# Patient Record
Sex: FEMALE
[De-identification: names, ages, dates, MRNs, and addresses within clinical notes are randomized per-mention and may not be internally consistent; named-entity substitution may affect disease eponyms.]

---

## 2018-05-16 ENCOUNTER — HOSPITAL ENCOUNTER (INPATIENT)
Dept: HOSPITAL 45 - C.EDC | Age: 68
LOS: 2 days | Discharge: HOME | DRG: 869 | End: 2018-05-18
Attending: HOSPITALIST | Admitting: HOSPITALIST
Payer: COMMERCIAL

## 2018-05-16 VITALS
HEART RATE: 79 BPM | OXYGEN SATURATION: 97 % | SYSTOLIC BLOOD PRESSURE: 100 MMHG | TEMPERATURE: 97.52 F | DIASTOLIC BLOOD PRESSURE: 63 MMHG

## 2018-05-16 VITALS
DIASTOLIC BLOOD PRESSURE: 65 MMHG | HEART RATE: 75 BPM | OXYGEN SATURATION: 99 % | SYSTOLIC BLOOD PRESSURE: 116 MMHG | TEMPERATURE: 98.24 F

## 2018-05-16 VITALS — OXYGEN SATURATION: 97 %

## 2018-05-16 VITALS
HEIGHT: 62 IN | BODY MASS INDEX: 29.41 KG/M2 | HEIGHT: 62 IN | BODY MASS INDEX: 29.41 KG/M2 | BODY MASS INDEX: 29.41 KG/M2 | WEIGHT: 159.84 LBS | WEIGHT: 159.84 LBS

## 2018-05-16 VITALS — OXYGEN SATURATION: 99 %

## 2018-05-16 DIAGNOSIS — I10: ICD-10-CM

## 2018-05-16 DIAGNOSIS — A77.49: Primary | ICD-10-CM

## 2018-05-16 DIAGNOSIS — M79.1: ICD-10-CM

## 2018-05-16 DIAGNOSIS — E11.9: ICD-10-CM

## 2018-05-16 DIAGNOSIS — Z88.1: ICD-10-CM

## 2018-05-16 DIAGNOSIS — Z79.82: ICD-10-CM

## 2018-05-16 DIAGNOSIS — M54.9: ICD-10-CM

## 2018-05-16 DIAGNOSIS — D70.9: ICD-10-CM
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
## 2018-05-16 DIAGNOSIS — E78.5: ICD-10-CM

## 2018-05-16 DIAGNOSIS — D69.6: ICD-10-CM

## 2018-05-16 DIAGNOSIS — Z79.84: ICD-10-CM

## 2018-05-16 DIAGNOSIS — R00.0: ICD-10-CM

## 2018-05-16 LAB
ALBUMIN SERPL-MCNC: 3.5 GM/DL (ref 3.4–5)
ALP SERPL-CCNC: 91 U/L (ref 45–117)
ALT SERPL-CCNC: 48 U/L (ref 12–78)
AST SERPL-CCNC: 67 U/L (ref 15–37)
BASOPHILS # BLD: 0.04 K/UL (ref 0–0.2)
BASOPHILS NFR BLD: 1.1 %
BUN SERPL-MCNC: 17 MG/DL (ref 7–18)
CALCIUM SERPL-MCNC: 8.4 MG/DL (ref 8.5–10.1)
CO2 SERPL-SCNC: 24 MMOL/L (ref 21–32)
CREAT SERPL-MCNC: 0.86 MG/DL (ref 0.6–1.2)
EOS ABS #: 0 K/UL (ref 0–0.5)
EOSINOPHIL NFR BLD AUTO: 74 K/UL (ref 130–400)
GLUCOSE SERPL-MCNC: 88 MG/DL (ref 70–99)
HCT VFR BLD CALC: 38.5 % (ref 37–47)
HGB BLD-MCNC: 13.4 G/DL (ref 12–16)
IG#: 0.01 K/UL (ref 0–0.02)
IMM GRANULOCYTES NFR BLD AUTO: 30.7 %
INFLUENZA B ANTIGEN: (no result)
INR PPP: 1 (ref 0.9–1.1)
LYMPHOCYTES # BLD: 1.07 K/UL (ref 1.2–3.4)
MCH RBC QN AUTO: 28.4 PG (ref 25–34)
MCHC RBC AUTO-ENTMCNC: 34.8 G/DL (ref 32–36)
MCV RBC AUTO: 81.6 FL (ref 80–100)
MONO ABS #: 0.39 K/UL (ref 0.11–0.59)
MONOCYTES NFR BLD: 11.2 %
NEUT ABS #: 1.98 K/UL (ref 1.4–6.5)
NEUTROPHILS # BLD AUTO: 0 %
NEUTROPHILS NFR BLD AUTO: 56.7 %
PMV BLD AUTO: 10 FL (ref 7.4–10.4)
POTASSIUM SERPL-SCNC: 3.9 MMOL/L (ref 3.5–5.1)
PROT SERPL-MCNC: 7.3 GM/DL (ref 6.4–8.2)
RED CELL DISTRIBUTION WIDTH CV: 13.9 % (ref 11.5–14.5)
RED CELL DISTRIBUTION WIDTH SD: 41.9 FL (ref 36.4–46.3)
SODIUM SERPL-SCNC: 133 MMOL/L (ref 136–145)
WBC # BLD AUTO: 3.49 K/UL (ref 4.8–10.8)

## 2018-05-16 RX ADMIN — LIDOCAINE SCH PATCH: 50 PATCH CUTANEOUS at 23:43

## 2018-05-16 RX ADMIN — INSULIN ASPART SCH UNITS: 100 INJECTION, SOLUTION INTRAVENOUS; SUBCUTANEOUS at 21:00

## 2018-05-16 RX ADMIN — KETOROLAC TROMETHAMINE PRN MG: 15 INJECTION INTRAMUSCULAR; INTRAVENOUS at 23:44

## 2018-05-16 NOTE — DIAGNOSTIC IMAGING REPORT
ABDOMEN AND PELVIS CT WITH IV CONTRAST



CT DOSE: 728.33 mGycm



HISTORY: Acute right lower quadrant abdominal pain with fever  fever rlq pain

r/o appy



TECHNIQUE: Multiaxial CT images of the abdomen and pelvis were performed

following the use of intravenous contrast.  A dose lowering technique was

utilized adhering to the principles of ALARA.



COMPARISON STUDY: None.



FINDINGS: 

Dependent subsegmental bibasilar opacities suggest atelectasis. There is no

pneumatosis or pneumoperitoneum identified. Coronary arterial calcifications are

noted.



Contracted gallbladder. Mild periportal edema, likely secondary to

overhydration. Spleen is mildly enlarged, 15 cm. Pancreas and adrenal glands are

within normal limits. Mild nonspecific bilateral perinephric stranding.

Bilateral renal cysts measuring up to 2.0 cm on the left. No renal calculi or

obstructive uropathy identified. Bladder is within normal limits. Prior

hysterectomy. No adnexal mass lesions identified. Trace free pelvic fluid of

unknown etiology. There is moderate mixed plaquing of the aorta without aneurysm

identified. No bulky adenopathy. Nonspecific mildly prominent and enlarged

periportal lymph nodes measure up to 12 mm in short axis.



Mild wall thickening of the distal gastric body and antrum with partial

distention. No bowel obstruction. Mild colonic diverticulosis without

diverticulitis. Moderate stool volume throughout the colon. Underdistention of

the transverse colon. High attenuating material seen within the cecum. The

visualized appendix appears normal.



Soft tissues are unremarkable. Dystrophic calcifications within the region of

the bilateral hamstring attachment sites, right greater than left.

Chondrocalcinosis with degenerative changes about the bilateral hips. Bone

island of the left iliac bones.



IMPRESSION: 



1.  No evidence of bowel obstruction. Normal appendix.

2. Mild wall thickening of the distal gastric body and antrum is likely

secondary to partial distention with gastritis thought to be less likely.

3. Mild nonspecific periportal adenopathy.

4. Splenomegaly.

5. Trace free pelvic fluid of unknown etiology.

6. Additional findings as above.







Electronically signed by:  Shaw Gtz M.D.

5/16/2018 4:26 PM



Dictated Date/Time:  5/16/2018 4:17 PM

## 2018-05-16 NOTE — HISTORY AND PHYSICAL
History & Physical


Date & Time of Service:


May 16, 2018 at 18:03


Chief Complaint:


Illness


Primary Care Physician:


Tonya Brown M.D.


History of Present Illness


Source:  patient, family (Daughter,  present at bedside)


This is a 67-year-old female with medical history of type 2 diabetes, presents 

to ER with complaint of weakness tiredness, low-grade fever for the past few 

weeks


At baseline patient is very active involved in housework independent in her ADLs


Had ongoing nausea, headache, poor appetite


history of multiple tick bites


Patient had 3 tics on her arm 2 weeks ago, which patient's her  removed 

with tweezers


Was seen at the clinic for her ongoing symptoms


Outpatient Lyme titer was checked which was negative 





This morning she had an witnessed fall, losing balance and falling on the floor-

 was present, helped patient to get up


Patient mentions of feeling dizzy and lightheaded prior to fall


No loss of consciousness, denies of any chest pain or shortness of breath





On arrival to ER patient was found to be febrile, borderline neutropenic, 

thrombocytopenic (had normal heart work in the past)





Past Medical/Surgical History


Medical Problems:


(1) Diabetes


(2) Fever


(3) HLD (hyperlipidemia)


(4) HTN (hypertension)


(5) Tachycardia








Social History


Smoking Status:  Never Smoker


Marital Status:  


Occupational Status:  retired





Allergies


Coded Allergies:  


     Cephalexin (Verified  Allergy, Intermediate, Rash, 5/16/18)





Home Medications


Scheduled


Aspirin (Aspirin Ec), 81 MG PO DAILY


Atorvastatin (Lipitor), 1 TAB PO DAILY


Glimepiride (Glimepiride), 1 TAB PO DAILY





Miscellaneous Medications


Empagliflozin (Jardiance)


Glucostix Blood Test Strips (Onetouch Ultra Blue)


Lisinopril (Lisinopril)


Magnesium Oxide (Mag-Ox), 400 MG PO


Metformin HCl (Metformin HCl ER)





Review of Systems


Constitutional:  + fever, + chills, + sweats, + weight loss, + weakness, + 

fatigue


Respiratory:  + shortness of breath, + dyspnea on exertion


Abdomen:  + pain (Abdominal pain), + nausea, + vomiting


Musculoskeletal:  + joint pain (Diffuse muscle and joint pain), + muscle pain, 

+ problem reported (Back pain after sustaining a fall)


Neurologic:  + weakness, + numbness/tingling, + vertigo, + balance problems


Endocrine:  + fatigue





Physical Exam


Vital Signs











  Date Time  Temp Pulse Resp B/P (MAP) Pulse Ox O2 Delivery O2 Flow Rate FiO2


 


5/16/18 18:00 36.7 70 18 112/59 99 Nasal Cannula  


 


5/16/18 17:12  83      


 


5/16/18 16:37 36.9 76 18 112/57 98 Nasal Cannula 2.0 


 


5/16/18 15:05 38.0       


 


5/16/18 14:31  99 18 128/68 97 Nasal Cannula 2.0 


 


5/16/18 14:01    134/74    


 


5/16/18 13:54  100   99   


 


5/16/18 13:49    144/76    


 


5/16/18 13:19  103      


 


5/16/18 13:00    148/85    


 


5/16/18 13:00     100 Nasal Cannula  


 


5/16/18 13:00     100 Nasal Cannula 2.0 


 


5/16/18 12:57 39.5 105 20 148/85 94 Room Air  








General Appearance:  + mild distress


Head:  normocephalic, atraumatic (100 back pain)


Eyes:  normal inspection, sclerae normal


ENT:  normal ENT inspection


Neck:  no carotid bruits, trachea midline


Respiratory/Chest:  chest non-tender, lungs clear, normal breath sounds, no 

respiratory distress


Cardiovascular:  regular rate, rhythm, no edema, normal peripheral pulses


Abdomen/GI:  normal bowel sounds, non tender, soft


Back:  + paravertebral tenderness, + pertinent finding (Tenderness in upper mid 

thoracic area started after fall this morning /no bruise / skin tear noted)


Extremities/Musculoskelatal:  no calf tenderness, no pedal edema


Neurologic/Psych:  no motor/sensory deficits, alert, normal mood/affect, 

oriented x 3, + pertinent finding (Generalized weakness)


Skin:  no rash





Diagnostics


Laboratory Results





Results Past 24 Hours








Test


  5/16/18


12:22 5/16/18


13:22 5/16/18


13:35 5/16/18


13:49 Range/Units


 


 


White Blood Count 3.49    4.8-10.8  K/uL


 


Red Blood Count 4.72    4.2-5.4  M/uL


 


Hemoglobin 13.4    12.0-16.0  g/dL


 


Hematocrit 38.5    37-47  %


 


Mean Corpuscular Volume 81.6      fL


 


Mean Corpuscular Hemoglobin 28.4    25-34  pg


 


Mean Corpuscular Hemoglobin


Concent 34.8


  


  


  


  32-36  g/dl


 


 


Platelet Count 74    130-400  K/uL


 


Mean Platelet Volume 10.0    7.4-10.4  fL


 


Neutrophils (%) (Auto) 56.7     %


 


Lymphocytes (%) (Auto) 30.7     %


 


Monocytes (%) (Auto) 11.2     %


 


Eosinophils (%) (Auto) 0.0     %


 


Basophils (%) (Auto) 1.1     %


 


Neutrophils # (Auto) 1.98    1.4-6.5  K/uL


 


Lymphocytes # (Auto) 1.07    1.2-3.4  K/uL


 


Monocytes # (Auto) 0.39    0.11-0.59  K/uL


 


Eosinophils # (Auto) 0.00    0-0.5  K/uL


 


Basophils # (Auto) 0.04    0-0.2  K/uL


 


RDW Standard Deviation 41.9    36.4-46.3  fL


 


RDW Coefficient of Variation 13.9    11.5-14.5  %


 


Immature Granulocyte % (Auto) 0.3     %


 


Immature Granulocyte # (Auto) 0.01    0.00-0.02  K/uL


 


Platelet Estimate DECREASED     


 


Sodium Level 133    136-145  mmol/L


 


Potassium Level 3.9    3.5-5.1  mmol/L


 


Chloride Level 100      mmol/L


 


Carbon Dioxide Level 24    21-32  mmol/L


 


Anion Gap 9.0    3-11  mmol/L


 


Blood Urea Nitrogen 17    7-18  mg/dl


 


Creatinine


  0.86


  


  


  


  0.60-1.20


mg/dl


 


Est Creatinine Clear Calc


Drug Dose 59.5


  


  


  


   ml/min


 


 


Estimated GFR (


American) 81.0


  


  


  


   


 


 


Estimated GFR (Non-


American 69.9


  


  


  


   


 


 


BUN/Creatinine Ratio 19.3    10-20  


 


Random Glucose 88    70-99  mg/dl


 


Calcium Level 8.4    8.5-10.1  mg/dl


 


Total Bilirubin 1.0    0.2-1  mg/dl


 


Direct Bilirubin 0.2    0-0.2  mg/dl


 


Aspartate Amino Transf


(AST/SGOT) 67


  


  


  


  15-37  U/L


 


 


Alanine Aminotransferase


(ALT/SGPT) 48


  


  


  


  12-78  U/L


 


 


Alkaline Phosphatase 91      U/L


 


Total Creatine Kinase 47      U/L


 


Total Protein 7.3    6.4-8.2  gm/dl


 


Albumin 3.5    3.4-5.0  gm/dl


 


Lyme Disease IgG Antibody NEG    NEG  


 


Lyme Disease IgM Antibody NEG    NEG  


 


Prothrombin Time


  


  10.7


  


  


  9.0-12.0


SECONDS


 


Prothromb Time International


Ratio 


  1.0


  


  


  0.9-1.1  


 


 


Lactic Acid Level   1.0  0.4-2.0  mmol/L


 


Influenza Type A Antigen    Neg for Influ A NEG  


 


Influenza Type B Antigen    Neg for Influ B NEG  


 


Test


  5/16/18


15:20 5/16/18


18:02 


  


  Range/Units


 


 


Urine Color YELLOW     


 


Urine Appearance CLEAR    CLEAR  


 


Urine pH 5.0    4.5-7.5  


 


Urine Specific Gravity 1.032    1.000-1.030  


 


Urine Protein TRACE    NEG  


 


Urine Glucose (UA) 3+    NEG  


 


Urine Ketones 2+    NEG  


 


Urine Occult Blood NEG    NEG  


 


Urine Nitrite NEG    NEG  


 


Urine Bilirubin NEG    NEG  


 


Urine Urobilinogen NEG    NEG  


 


Urine Leukocyte Esterase NEG    NEG  


 


Urine WBC (Auto) 1-5    0-5  /hpf


 


Urine RBC (Auto) 0-4    0-4  /hpf


 


Urine Hyaline Casts (Auto) 1-5    0-5  /lpf


 


Urine Epithelial Cells (Auto) 5-10    0-5  /lpf


 


Urine Bacteria (Auto) NEG    NEG  








Microbiology Results


5/16/18 Blood Culture, Received


          Pending


5/16/18 Blood Culture, Received


          Pending





Diagnostic Radiology


Chest x-ray two-view





IMPRESSION: No active disease in the chest.





CT abdomen pelvis:


IMPRESSION: 





1.  No evidence of bowel obstruction. Normal appendix.


2. Mild wall thickening of the distal gastric body and antrum is likely


secondary to partial distention with gastritis thought to be less likely.


3. Mild nonspecific periportal adenopathy.


4. Splenomegaly.


5. Trace free pelvic fluid of unknown etiology.


6. Additional findings as above.





Impression


Assessment and Plan


FEVER, TACHYCARDIA, POSSIBLE TICK BORNE DISEASE


History of tick bite, multiple tics removed from arm approximately 2 weeks ago


Presents with more than 1 week symptoms of fever, chills generalized weakness 

fatigue, nausea vomiting headache poor appetite


Lyme titer negative


No typical rash noted


Symptom most suggestive of anaplasmosis-


Antigen and antibody titer for anaplasmosis ordered


Started with empiric antibiotic with doxycycline


IV hydration


ID eval requested











THROMBOCYTOPENIA/NEUTROPENIA


Possible secondary to tickborne disease/anaplasmosis


Continue empiric antibiotic with IV doxycycline


Follow daily CBC


Avoid antiplatelets


Ordered for peripheral blood smear-pathology to review





GENERALIZED MUSCLE PAIN AND ACHE


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


Pain controlled with as needed Tylenol


IV hydration





BACK PAIN:


After sustaining a fall earlier today/due to losing balance and generalized 

weakness


X-ray of thoracolumbar spine ordered











TYPE 2 DIABETES


Hold oral meds


Insulin sliding scale








CODE STATUS: Full code





DVT prophylaxis:





Disposition:


At baseline patient is very active


Lives at home with 


PT OT eval requested


Service consulted for discharge planning





Level of Care


Telemetry





Resuscitation Status


FULL NO CARDIOVERSION





VTE Prophylaxis


Risk Level:  Moderate


Given or contraindicated:  T.E.D. Stockings, SCD's

## 2018-05-16 NOTE — DIAGNOSTIC IMAGING REPORT
CHEST 2 VIEWS ROUTINE



CLINICAL HISTORY: Fever, sepsis.    



COMPARISON STUDY:  No previous studies for comparison.



FINDINGS: The cardiac and mediastinal contours are normal. There is no evidence

of focal pulmonary consolidation. There is no evidence of failure. No pleural

effusions are visualized.[ 



IMPRESSION: No active disease in the chest.







Electronically signed by:  Akhil Dickson M.D.

5/16/2018 1:47 PM



Dictated Date/Time:  5/16/2018 1:47 PM

## 2018-05-16 NOTE — EMERGENCY ROOM VISIT NOTE
History


Report prepared by Shey:  Smith Maza


Under the Supervision of:  Dr. Don Davis M.D.


First contact with patient:  13:13


Chief Complaint:  ILLNESS


Stated Complaint:  ILLNESS





History of Present Illness


The patient is a 67 year old female who presents to the Emergency Room with 

complaints of fever.  Per family, the patient has had body aches, nausea, and 

fevers over the past week. They note that the patient had three ticks on her 

two weeks ago, which the patient's  removed with tweezers.  No 

antibiotics were given.. The patient had a negative test for Lyme Disease 

earlier this week. she denies chest pain, SOB, headache, neck pain, urinary 

symptoms, or cough. She is not on supplemental oxygen at home. The patient 

states that she has been having some RLQ abdominal pain recently as well. She 

has been taking Tylenol and Pepto Bismol for this. She has a history of diabetes

, and states that they have been running high lately. The patient denies recent 

travel. She notes that she is allergic to Keflex.





   Source of History:  patient, family


   Onset:  A few days ago


   Quality:  other (dizziness)


   Timing:  intermittent


   Modifying Factors (Worsening):  other (standing up)


   Associated Symptoms:  + fevers, + nausea, No headache, No cough, No neck pain

, No chest pain, No SOB, No urinary symptoms


Note:


Positive: body aches.





Review of Systems


See HPI for pertinent positives and negatives.  A total of ten systems were 

reviewed and were otherwise negative.





Past Medical & Surgical


Medical Problems:


(1) Diabetes


(2) Fever


(3) HLD (hyperlipidemia)


(4) HTN (hypertension)


(5) Tachycardia








Family History


No pertinent family history stated.





Social History


Smoking Status:  Never Smoker


Marital Status:  


Occupation Status:  retired





Current/Historical Medications


Scheduled


Aspirin (Aspirin Ec), 81 MG PO DAILY


Atorvastatin (Lipitor), 1 TAB PO DAILY


Glimepiride (Glimepiride), 1 TAB PO DAILY





Miscellaneous Medications


Empagliflozin (Jardiance)


Glucostix Blood Test Strips (Onetouch Ultra Blue)


Lisinopril (Lisinopril)


Magnesium Oxide (Mag-Ox), 400 MG PO


Metformin HCl (Metformin HCl ER)





Allergies


Coded Allergies:  


     Cephalexin (Verified  Allergy, Intermediate, Rash, 5/16/18)





Physical Exam


Vital Signs











  Date Time  Temp Pulse Resp B/P (MAP) Pulse Ox O2 Delivery O2 Flow Rate FiO2


 


5/16/18 18:00 36.7 70 18 112/59 99 Nasal Cannula  


 


5/16/18 17:12  83      


 


5/16/18 16:37 36.9 76 18 112/57 98 Nasal Cannula 2.0 


 


5/16/18 15:05 38.0       


 


5/16/18 14:31  99 18 128/68 97 Nasal Cannula 2.0 


 


5/16/18 14:01    134/74    


 


5/16/18 13:54  100   99   


 


5/16/18 13:49    144/76    


 


5/16/18 13:19  103      


 


5/16/18 13:00    148/85    


 


5/16/18 13:00     100 Nasal Cannula  


 


5/16/18 13:00     100 Nasal Cannula 2.0 


 


5/16/18 12:57 39.5 105 20 148/85 94 Room Air  











Physical Exam


Physical Exam 


GENERAL:  She is oriented to person, place, and time. She appears well-

developed and well-nourished. She does not appear distressed. ____


HENT:  Exam performed. 


   Head:  Normocephalic and atraumatic. 


   Right Ear:  External ear normal. No mastoid tenderness. 


   Left Ear: External ear normal. No mastoid tenderness. 


   Mouth/Throat:  The oropharynx is clear and moist. No trismus in the jaw. No 

dental abscesses or uvula swelling. No oropharyngeal exudate or tonsillar 

abscesses. ____


EYES: Conjunctivae and EOM are normal. Pupils are equal, round, and reactive to 

light. Right eye exhibits no discharge. Left eye exhibits no discharge. No 

scleral icterus. ____


NECK: Normal range of motion. Neck supple. No JVD present. No spinous process 

tenderness present. No carotid bruit present. No rigidity. No tracheal 

deviation and normal range of motion present. No Brudzinski's sign and no Kernig

's sign noted. ____


CV: Normal rate, regular rhythm, normal heart sounds and intact distal pulses. 

There is no peripheral edema. Palpable radial pulses bue.  ____


PULM/CHEST:  Effort normal and breath sounds normal. No respiratory distress. 

No stridor. She has no wheezes. She has no rales. 


   Chest Wall:  She exhibits no tenderness. ____


ABD: The abdomen is soft. Bowel sounds are normal. She has no distension. No 

mass is present. There is no tenderness. There is no rebound, no guarding, no 

Lyman's sign and no tenderness at McBurney's point. Rovsig negative ________


MUSC/SKEL: Normal range of motion. There is no peripheral edema, tenderness or 

deformity. ________


LYMPH: No cervical adenopathy. ____


NEURO: She is alert and oriented to person, place, and time. She has normal 

strength. No cranial nerve deficit or sensory deficit. Coordination and gait 

normal. GCS eye subscore is 4. GCS verbal subscore is 5. GCS motor subscore is 

6. Cerebellar tests wnl. ____


SKIN: Skin is warm and dry. She is not diaphoretic. Right lateral thigh with a 

bug bite with surrounding erythema. No tick present. No fluctuance. No 

discharge. Non-vesicular. Nikolsky negative.  Left medial thigh with an a 

similar apparent bug bite. Surrounding erythema noted. No tick present. No 

fluctuance. No discharge. Non-vesicular. Nikolsky negative.   ____


PSYCH: She has a normal mood and affect. Her behavior is normal. Judgment and 

thought content normal. ____





Medical Decision & Procedures


ER Provider


Diagnostic Interpretation:


Radiology results as stated below per my review and radiologist interpretation: 





ABDOMEN AND PELVIS CT WITH IV CONTRAST





FINDINGS: 


Dependent subsegmental bibasilar opacities suggest atelectasis. There is no


pneumatosis or pneumoperitoneum identified. Coronary arterial calcifications are


noted.





Contracted gallbladder. Mild periportal edema, likely secondary to


overhydration. Spleen is mildly enlarged, 15 cm. Pancreas and adrenal glands are


within normal limits. Mild nonspecific bilateral perinephric stranding.


Bilateral renal cysts measuring up to 2.0 cm on the left. No renal calculi or


obstructive uropathy identified. Bladder is within normal limits. Prior


hysterectomy. No adnexal mass lesions identified. Trace free pelvic fluid of


unknown etiology. There is moderate mixed plaquing of the aorta without aneurysm


identified. No bulky adenopathy. Nonspecific mildly prominent and enlarged


periportal lymph nodes measure up to 12 mm in short axis.





Mild wall thickening of the distal gastric body and antrum with partial


distention. No bowel obstruction. Mild colonic diverticulosis without


diverticulitis. Moderate stool volume throughout the colon. Underdistention of


the transverse colon. High attenuating material seen within the cecum. The


visualized appendix appears normal.





Soft tissues are unremarkable. Dystrophic calcifications within the region of


the bilateral hamstring attachment sites, right greater than left.


Chondrocalcinosis with degenerative changes about the bilateral hips. Bone


island of the left iliac bones.





IMPRESSION: 





1.  No evidence of bowel obstruction. Normal appendix.


2. Mild wall thickening of the distal gastric body and antrum is likely


secondary to partial distention with gastritis thought to be less likely.


3. Mild nonspecific periportal adenopathy.


4. Splenomegaly.


5. Trace free pelvic fluid of unknown etiology.


6. Additional findings as above.





Electronically signed by:  Shaw Gtz M.D.


5/16/2018 4:26 PM








CHEST 2 VIEWS ROUTINE





FINDINGS: The cardiac and mediastinal contours are normal. There is no evidence


of focal pulmonary consolidation. There is no evidence of failure. No pleural


effusions are visualized.[ 





IMPRESSION: No active disease in the chest.





Electronically signed by:  Akhil Dickson M.D.


5/16/2018 1:47 PM





Laboratory Results


5/16/18 12:22








Red Blood Count 4.72, Mean Corpuscular Volume 81.6, Mean Corpuscular Hemoglobin 

28.4, Mean Corpuscular Hemoglobin Concent 34.8, Mean Platelet Volume 10.0, 

Neutrophils (%) (Auto) 56.7, Lymphocytes (%) (Auto) 30.7, Monocytes (%) (Auto) 

11.2, Eosinophils (%) (Auto) 0.0, Basophils (%) (Auto) 1.1, Neutrophils # (Auto

) 1.98, Lymphocytes # (Auto) 1.07, Monocytes # (Auto) 0.39, Eosinophils # (Auto

) 0.00, Basophils # (Auto) 0.04





5/16/18 12:22

















Test


  5/16/18


12:22 5/16/18


13:22 5/16/18


13:35 5/16/18


13:49


 


White Blood Count


  3.49 K/uL


(4.8-10.8) 


  


  


 


 


Red Blood Count


  4.72 M/uL


(4.2-5.4) 


  


  


 


 


Hemoglobin


  13.4 g/dL


(12.0-16.0) 


  


  


 


 


Hematocrit 38.5 % (37-47)    


 


Mean Corpuscular Volume


  81.6 fL


() 


  


  


 


 


Mean Corpuscular Hemoglobin


  28.4 pg


(25-34) 


  


  


 


 


Mean Corpuscular Hemoglobin


Concent 34.8 g/dl


(32-36) 


  


  


 


 


Platelet Count


  74 K/uL


(130-400) 


  


  


 


 


Mean Platelet Volume


  10.0 fL


(7.4-10.4) 


  


  


 


 


Neutrophils (%) (Auto) 56.7 %    


 


Lymphocytes (%) (Auto) 30.7 %    


 


Monocytes (%) (Auto) 11.2 %    


 


Eosinophils (%) (Auto) 0.0 %    


 


Basophils (%) (Auto) 1.1 %    


 


Neutrophils # (Auto)


  1.98 K/uL


(1.4-6.5) 


  


  


 


 


Lymphocytes # (Auto)


  1.07 K/uL


(1.2-3.4) 


  


  


 


 


Monocytes # (Auto)


  0.39 K/uL


(0.11-0.59) 


  


  


 


 


Eosinophils # (Auto)


  0.00 K/uL


(0-0.5) 


  


  


 


 


Basophils # (Auto)


  0.04 K/uL


(0-0.2) 


  


  


 


 


RDW Standard Deviation


  41.9 fL


(36.4-46.3) 


  


  


 


 


RDW Coefficient of Variation


  13.9 %


(11.5-14.5) 


  


  


 


 


Immature Granulocyte % (Auto) 0.3 %    


 


Immature Granulocyte # (Auto)


  0.01 K/uL


(0.00-0.02) 


  


  


 


 


Platelet Estimate DECREASED    


 


Anion Gap


  9.0 mmol/L


(3-11) 


  


  


 


 


Est Creatinine Clear Calc


Drug Dose 59.5 ml/min 


  


  


  


 


 


Estimated GFR (


American) 81.0 


  


  


  


 


 


Estimated GFR (Non-


American 69.9 


  


  


  


 


 


BUN/Creatinine Ratio 19.3 (10-20)    


 


Calcium Level


  8.4 mg/dl


(8.5-10.1) 


  


  


 


 


Total Bilirubin


  1.0 mg/dl


(0.2-1) 


  


  


 


 


Direct Bilirubin


  0.2 mg/dl


(0-0.2) 


  


  


 


 


Aspartate Amino Transf


(AST/SGOT) 67 U/L (15-37) 


  


  


  


 


 


Alanine Aminotransferase


(ALT/SGPT) 48 U/L (12-78) 


  


  


  


 


 


Alkaline Phosphatase


  91 U/L


() 


  


  


 


 


Total Creatine Kinase


  47 U/L


() 


  


  


 


 


Total Protein


  7.3 gm/dl


(6.4-8.2) 


  


  


 


 


Albumin


  3.5 gm/dl


(3.4-5.0) 


  


  


 


 


Lyme Disease IgG Antibody NEG (NEG)    


 


Lyme Disease IgM Antibody NEG (NEG)    


 


Prothrombin Time


  


  10.7 SECONDS


(9.0-12.0) 


  


 


 


Prothromb Time International


Ratio 


  1.0 (0.9-1.1) 


  


  


 


 


Lactic Acid Level


  


  


  1.0 mmol/L


(0.4-2.0) 


 


 


Influenza Type A Antigen


  


  


  


  Neg for Influ


A (NEG)


 


Influenza Type B Antigen


  


  


  


  Neg for Influ


B (NEG)


 


Test


  5/16/18


15:20 


  


  


 


 


Urine Color YELLOW    


 


Urine Appearance CLEAR (CLEAR)    


 


Urine pH 5.0 (4.5-7.5)    


 


Urine Specific Gravity


  1.032


(1.000-1.030) 


  


  


 


 


Urine Protein TRACE (NEG)    


 


Urine Glucose (UA) 3+ (NEG)    


 


Urine Ketones 2+ (NEG)    


 


Urine Occult Blood NEG (NEG)    


 


Urine Nitrite NEG (NEG)    


 


Urine Bilirubin NEG (NEG)    


 


Urine Urobilinogen NEG (NEG)    


 


Urine Leukocyte Esterase NEG (NEG)    


 


Urine WBC (Auto) 1-5 /hpf (0-5)    


 


Urine RBC (Auto) 0-4 /hpf (0-4)    


 


Urine Hyaline Casts (Auto) 1-5 /lpf (0-5)    


 


Urine Epithelial Cells (Auto)


  5-10 /lpf


(0-5) 


  


  


 


 


Urine Bacteria (Auto) NEG (NEG)    





Laboratory results reviewed by me





Medications Administered











 Medications


  (Trade)  Dose


 Ordered  Sig/Jarrett


 Route  Start Time


 Stop Time Status Last Admin


Dose Admin


 


 Acetaminophen


  (Tylenol Tab)  1,000 mg  NOW  STAT


 PO  5/16/18 13:16


 5/16/18 13:26 DC 5/16/18 13:34


1,000 MG


 


 Sodium Chloride  1,000 ml @ 


 999 mls/hr  Q1H1M STAT


 IV  5/16/18 13:16


 5/16/18 14:16 DC 5/16/18 13:35


999 MLS/HR


 


 Miscellaneous


 Information


  (Patient'S


 Allergy Info


 Needs Entered)  1 ea  Q30M


 N/A  5/16/18 13:30


 6/15/18 13:29  5/16/18 13:30


1 EA


 


 Morphine Sulfate


  (MoRPHine


 SULFATE INJ)  2 mg  STK-MED ONCE


 .ROUTE  5/16/18 16:32


 5/16/18 16:33 DC 5/16/18 16:34


2 MG


 


 Ceftriaxone Sodium


  (Rocephin Inj)  1 gm  NOW  STAT


 IV  5/16/18 16:57


 5/16/18 16:58 DC 5/16/18 17:04


1 GM











ECG Per My Interpretation


Indication:  other (dizziness)


Rate (beats per minute):  99


Rhythm:  sinus rhythm


Findings:  other (WI, QTC, and QRS intervals within normal limits. No ST 

elevations or depressions. )





ED Course


1314: The patient was evaluated in room C9. A complete history and physical 

exam was performed. Sepsis orders initiated. Patient declines anything for pain 

at this time. 





1316: Ordered Sodium Chloride 1000 ml @ 999 mls/hr IV, Tylenol Tab 1000 mg PO.





1552: Ordered Morphine SUlfate 2 mg IV.





1654: Patient's vitals are improved s/p fluid administration as well as 

antipyretic use. Labs within normal limits with exception of low white blood 

cell count (3.49), and low platelet count (74). Urinalysis, Lyme antibodies, 

and chest x-ray are within normal limits. CT abdomen shows a normal appendix. 

Given patient's tachycardia, fever, and low white blood cell count, the patient 

is SIRS positive without clear source. She will be admitted to the hospital. 

Blood cultures are pending. Given that the patient has had multiple tick bites (

which have been removed), she will be treated empirically with Rocephin. Upon 

reexamination, the patient has no meningeal signs and continues to deny 

headache or neck pain. Discussed the patient's case with Dr. Bañuelos. The 

patient will be evaluated for further treatment and admission.





1657: Ordered Rocephin Inj 1 gm IV.





Medical Decision


Patient's vitals are improved s/p fluid administration as well as antipyretic 

use. Labs within normal limits with exception of low white blood cell count (

3.49), and low platelet count (74). Urinalysis, Lyme antibodies, and chest x-

ray are within normal limits. CT abdomen shows a normal appendix. Given patient'

s tachycardia, fever, and low white blood cell count, the patient is SIRS 

positive without clear source. She will be admitted to the hospital. Blood 

cultures are pending. Given that the patient has had multiple tick bites (which 

have been removed), she will be treated empirically with Rocephin. Upon 

reexamination, the patient has no meningeal signs and continues to deny 

headache or neck pain. Discussed the patient's case with Dr. Bañuelos. The 

patient will be evaluated for further treatment and admission.





Medication Reconcilliation


Current Medication List:  was personally reviewed by me





Blood Pressure Screening


Patient's blood pressure:  Normal blood pressure


Blood pressure disposition:  Did not require urgent referral





Consults


Time Called:  1648


Consulting Physician:  Dr. Sarmad Gamez Hospitalist


Returned Call:  [phone]


Discussed the patient's case with Dr. Bañuelos. The patient will be evaluated for 

further treatment and disposition.





Impression





 Primary Impression:  


 SIRS (systemic inflammatory response syndrome)





Scribe Attestation


The scribe's documentation has been prepared under my direction and personally 

reviewed by me in its entirety. I confirm that the note above accurately 

reflects all work, treatment, procedures, and medical decision making performed 

by me.





The chart was completed utilizing Dragon Speech voice recognition software. 

Grammatical errors, random word insertions, pronoun errors, and incomplete 

sentences are an occasional consequence of this system due to software 

limitations, ambient noise, and hardware issues. Any formal questions or 

concerns about the content, text, or information contained within the body of 

this dictation should be directly addressed to the physician for clarification.





Departure Information


Dispostion


Being Evaluated By Hospitalist





Patient Instructions


My [facility]

## 2018-05-17 VITALS
DIASTOLIC BLOOD PRESSURE: 57 MMHG | HEART RATE: 61 BPM | OXYGEN SATURATION: 96 % | TEMPERATURE: 98.24 F | SYSTOLIC BLOOD PRESSURE: 107 MMHG

## 2018-05-17 VITALS — OXYGEN SATURATION: 97 %

## 2018-05-17 VITALS
TEMPERATURE: 98.6 F | SYSTOLIC BLOOD PRESSURE: 124 MMHG | HEART RATE: 77 BPM | OXYGEN SATURATION: 100 % | DIASTOLIC BLOOD PRESSURE: 65 MMHG

## 2018-05-17 VITALS
OXYGEN SATURATION: 97 % | HEART RATE: 65 BPM | TEMPERATURE: 98.06 F | DIASTOLIC BLOOD PRESSURE: 64 MMHG | SYSTOLIC BLOOD PRESSURE: 121 MMHG

## 2018-05-17 VITALS
DIASTOLIC BLOOD PRESSURE: 80 MMHG | TEMPERATURE: 98.96 F | SYSTOLIC BLOOD PRESSURE: 145 MMHG | HEART RATE: 80 BPM | OXYGEN SATURATION: 92 %

## 2018-05-17 VITALS
OXYGEN SATURATION: 97 % | DIASTOLIC BLOOD PRESSURE: 70 MMHG | TEMPERATURE: 97.88 F | SYSTOLIC BLOOD PRESSURE: 114 MMHG | HEART RATE: 68 BPM

## 2018-05-17 VITALS
OXYGEN SATURATION: 97 % | DIASTOLIC BLOOD PRESSURE: 64 MMHG | HEART RATE: 69 BPM | SYSTOLIC BLOOD PRESSURE: 122 MMHG | TEMPERATURE: 98.42 F

## 2018-05-17 VITALS — OXYGEN SATURATION: 99 %

## 2018-05-17 LAB
ALBUMIN SERPL-MCNC: 2.7 GM/DL (ref 3.4–5)
ALP SERPL-CCNC: 68 U/L (ref 45–117)
ALT SERPL-CCNC: 36 U/L (ref 12–78)
AST SERPL-CCNC: 49 U/L (ref 15–37)
BUN SERPL-MCNC: 16 MG/DL (ref 7–18)
CALCIUM SERPL-MCNC: 7.4 MG/DL (ref 8.5–10.1)
CO2 SERPL-SCNC: 25 MMOL/L (ref 21–32)
CREAT SERPL-MCNC: 0.65 MG/DL (ref 0.6–1.2)
EOSINOPHIL NFR BLD AUTO: 63 K/UL (ref 130–400)
GLUCOSE SERPL-MCNC: 53 MG/DL (ref 70–99)
HBA1C MFR BLD: 7.7 % (ref 4.5–5.6)
HCT VFR BLD CALC: 32.2 % (ref 37–47)
HGB BLD-MCNC: 11 G/DL (ref 12–16)
MCH RBC QN AUTO: 28.4 PG (ref 25–34)
MCHC RBC AUTO-ENTMCNC: 34.2 G/DL (ref 32–36)
MCV RBC AUTO: 83 FL (ref 80–100)
PMV BLD AUTO: 10.9 FL (ref 7.4–10.4)
POTASSIUM SERPL-SCNC: 3.9 MMOL/L (ref 3.5–5.1)
PROT SERPL-MCNC: 5.7 GM/DL (ref 6.4–8.2)
RED CELL DISTRIBUTION WIDTH CV: 14.2 % (ref 11.5–14.5)
RED CELL DISTRIBUTION WIDTH SD: 43.2 FL (ref 36.4–46.3)
SODIUM SERPL-SCNC: 138 MMOL/L (ref 136–145)
WBC # BLD AUTO: 3.99 K/UL (ref 4.8–10.8)

## 2018-05-17 RX ADMIN — INSULIN ASPART SCH UNITS: 100 INJECTION, SOLUTION INTRAVENOUS; SUBCUTANEOUS at 16:15

## 2018-05-17 RX ADMIN — LISINOPRIL SCH MG: 40 TABLET ORAL at 09:19

## 2018-05-17 RX ADMIN — KETOROLAC TROMETHAMINE PRN MG: 15 INJECTION INTRAMUSCULAR; INTRAVENOUS at 18:31

## 2018-05-17 RX ADMIN — INSULIN ASPART SCH UNITS: 100 INJECTION, SOLUTION INTRAVENOUS; SUBCUTANEOUS at 12:20

## 2018-05-17 RX ADMIN — KETOROLAC TROMETHAMINE PRN MG: 15 INJECTION INTRAMUSCULAR; INTRAVENOUS at 12:13

## 2018-05-17 RX ADMIN — KETOROLAC TROMETHAMINE PRN MG: 15 INJECTION INTRAMUSCULAR; INTRAVENOUS at 06:33

## 2018-05-17 RX ADMIN — INSULIN ASPART SCH UNITS: 100 INJECTION, SOLUTION INTRAVENOUS; SUBCUTANEOUS at 07:00

## 2018-05-17 RX ADMIN — Medication SCH MG: at 09:19

## 2018-05-17 RX ADMIN — DOXYCYCLINE HYCLATE SCH MG: 100 CAPSULE, GELATIN COATED ORAL at 20:19

## 2018-05-17 RX ADMIN — INSULIN ASPART SCH UNITS: 100 INJECTION, SOLUTION INTRAVENOUS; SUBCUTANEOUS at 20:19

## 2018-05-17 RX ADMIN — LIDOCAINE SCH PATCH: 50 PATCH CUTANEOUS at 09:20

## 2018-05-17 RX ADMIN — ATORVASTATIN CALCIUM SCH MG: 20 TABLET, FILM COATED ORAL at 09:19

## 2018-05-17 NOTE — MEDICAL CONSULT
Consultation


Date of Consultation:


May 17, 2018.


Attending Physician:


Edward Lee MD


Reason for Consultation:


Sepsis


History of Present Illness


67-year-old female with history of diabetes mellitus was well-controlled 

approximately 1 week ago when she had onset of fever, chills, nausea and 

vomiting, abdominal pain, headache, and body aches.  She reports that she had 

tick exposure 1 week earlier with multiple ticks removed.  She ultimately was 

brought to the emergency room where she was found to have mild neutropenia 

along with thrombocytopenia, with minimal elevation of liver enzymes.  She was 

started empirically on doxycycline, and this morning reports that she is 

feeling significantly better, with ability to eat breakfast for the first time.

  Has had CT scan of the abdomen, read by me, which shows mild splenomegaly.  

No rash.  No other significant travel or exposure history.





Past Medical/Surgical History


Medical Problems:


(1) SIRS (systemic inflammatory response syndrome)


Status: Acute  





Medical Problems:


(1) Diabetes


(2) Fever


(3) HLD (hyperlipidemia)


(4) HTN (hypertension)


(5) Tachycardia











Social History


Smoking Status:  Never Smoker


Marital Status:  


Occupation Status:  retired





Allergies


Coded Allergies:  


     Cephalexin (Verified  Allergy, Intermediate, Rash, 5/16/18)





Current Inpatient Medications





Current Inpatient Medications








 Medications


  (Trade)  Dose


 Ordered  Sig/Jarrett


 Route  Start Time


 Stop Time Status Last Admin


Dose Admin


 


 Ioversol


  (Optiray 320)  100 ml  UD  PRN


 IV  5/16/18 13:30


 5/20/18 13:29   


 


 


 Al Hydrox/Mg


 Hydrox/Simethicone


  (Maalox Max Susp)  15 ml  Q4H  PRN


 PO  5/16/18 18:00


 6/15/18 17:59   


 


 


 Magnesium


 Hydroxide


  (Milk Of


 Magnesia Susp)  30 ml  Q12H  PRN


 PO  5/16/18 18:00


 6/15/18 17:59   


 


 


 Ondansetron HCl


  (Zofran Inj)  4 mg  Q6H  PRN


 IV  5/16/18 18:00


 6/15/18 17:59   


 


 


 Nitroglycerin


  (Nitrostat Tab)  0.4 mg  UD  PRN


 SL  5/16/18 18:00


 6/15/18 17:59   


 


 


 Aspirin


  (Ecotrin Tab)  81 mg  QAM


 PO  5/17/18 09:00


 6/16/18 08:59  5/17/18 09:19


81 MG


 


 Polyethylene


  (Miralax Powder


 Packet)  17 gm  DAILY  PRN


 PO  5/16/18 18:00


 6/15/18 17:59   


 


 


 Doxycycline


 Hyclate 100 mg/


 Dextrose  110 ml @ 


 50 mls/hr  Q12H


 IV  5/17/18 08:00


 5/31/18 07:59  5/17/18 09:19


50 MLS/HR


 


 Atorvastatin


 Calcium


  (Lipitor Tab)  20 mg  DAILY


 PO  5/17/18 09:00


 6/16/18 08:59  5/17/18 09:19


20 MG


 


 Lisinopril


  (Zestril Tab)  40 mg  DAILY


 PO  5/17/18 09:00


 6/16/18 08:59  5/17/18 09:19


40 MG


 


 Magnesium Oxide


  (Mag-Ox Tab)  400 mg  DAILY


 PO  5/17/18 09:00


 6/16/18 08:59  5/17/18 09:19


400 MG


 


 Insulin Aspart


  (novoLOG ASPART)  **SLIDING


 SCALE**


 **If C...  ACHS


 SC  5/16/18 21:00


 6/15/18 20:59   


 


 


 Glucose


  (Glucose 40% Gel)  15-30


 GRAMS 15


 GRAMS...  UD  PRN


 PO  5/16/18 18:45


 6/15/18 18:44   


 


 


 Glucose


  (Glucose Chew


 Tab)  4-8


 Tablets 4


 Tabl...  UD  PRN


 PO  5/16/18 18:45


 6/15/18 18:44   


 


 


 Dextrose


  (Dextrose 50%


 50ML Syringe)  25-50ML


 25ML FOR


 ...  UD  PRN


 IV  5/16/18 18:45


 6/15/18 18:44   


 


 


 Glucagon


  (Glucagon Inj)  1 mg  UD  PRN


 SQ  5/16/18 18:45


 6/15/18 18:44   


 


 


 Carbohydrates


  (Carbohydrates


 For Hypoglycemia)  15-30 GRAMS


 15 grams if


 BSG 54-69...  UD  PRN


 PO  5/16/18 18:45


 6/15/18 18:44   


 


 


 Acetaminophen


  (Tylenol Tab)  650 mg  Q6  PRN


 PO  5/16/18 22:30


 6/15/18 22:29   


 


 


 Lidocaine


  (Lidoderm Patch


 5%)  1 patch  QAM


 TD  5/16/18 22:30


 6/15/18 22:29  5/17/18 09:20


1 PATCH


 


 Miscellaneous


  (Remove Lidoderm


 Patch)  1 ea  DAILY@21


 N/A  5/17/18 21:00


 6/16/18 20:59   


 


 


 Ketorolac


 Tromethamine


  (Toradol Inj)  15 mg  Q6H  PRN


 IV  5/16/18 22:45


 5/21/18 22:44  5/17/18 06:33


15 MG











Review of Systems


Constitutional:  + fever, + chills, + weakness, + fatigue


Eyes:  No problem reported


ENT:  No problem reported


Respiratory:  No problem reported


Abdomen:  + pain, + nausea, + vomiting


Musculoskeletal:  + joint pain, + muscle pain


Genitourinary - Female:  No problem reported


Neurologic:  No problem reported


Psychiatric:  No problem reported


Endocrine:  + fatigue


Hematologic / Lymphatic:  No problem reported


Integumentary:  No problem reported


Allergic / Immunologic:  No problem reported





Physical Exam











  Date Time  Temp Pulse Resp B/P (MAP) Pulse Ox O2 Delivery O2 Flow Rate FiO2


 


5/17/18 07:22 36.6 68 16 114/70 (85) 97 Room Air  


 


5/17/18 04:00     99   


 


5/17/18 03:50 36.8 61 16 107/57 (74) 96 Room Air  


 


5/17/18 00:01 37.0 77 16 124/65 (84) 100 Nasal Cannula 2.0 


 


5/16/18 23:59     99 Nasal Cannula 3.0 


 


5/16/18 20:25 36.8 75 18 116/65 (82) 99 Nasal Cannula 2.5 


 


5/16/18 20:00     97 Nasal Cannula 3.0 


 


5/16/18 18:42 36.4 79 18 100/63 97 Nasal Cannula 3.0 


 


5/16/18 18:00 36.7 70 18 112/59 99 Nasal Cannula  


 


5/16/18 17:12  83      


 


5/16/18 16:37 36.9 76 18 112/57 98 Nasal Cannula 2.0 


 


5/16/18 15:05 38.0       


 


5/16/18 14:31  99 18 128/68 97 Nasal Cannula 2.0 


 


5/16/18 14:01    134/74    


 


5/16/18 13:54  100   99   


 


5/16/18 13:49    144/76    


 


5/16/18 13:19  103      


 


5/16/18 13:00    148/85    


 


5/16/18 13:00     100 Nasal Cannula  


 


5/16/18 13:00     100 Nasal Cannula 2.0 


 


5/16/18 12:57 39.5 105 20 148/85 94 Room Air  








General Appearance:  WD/WN, no apparent distress


Head:  normocephalic, atraumatic


Eyes:  normal inspection, EOMI, sclerae normal


ENT:  normal ENT inspection, hearing grossly normal, pharynx normal


Neck:  supple, no adenopathy, thyroid normal, trachea midline


Respiratory/Chest:  chest non-tender, lungs clear, normal breath sounds, no 

respiratory distress


Cardiovascular:  regular rate, rhythm, no gallop, no murmur


Abdomen/GI:  normal bowel sounds, non tender, soft, no organomegaly


Back:  normal inspection, no CVA tenderness


Extremities/Musculoskelatal:  no calf tenderness, normal capillary refill, non-

tender


Neurologic/Psych:  alert, normal mood/affect, oriented x 3


Skin:  normal color, warm/dry, no rash


Lymphatic:  no adenopathy





Laboratory Results











 Date/Time


Source Procedure


Growth Status


 


 


 5/16/18 14:58


Blood Blood Culture


Pending Received


 


 5/16/18 13:35


Blood Blood Culture


Pending Received


 


 5/17/18 02:50


Urine , Clean Catch Urine Culture


Pending Received








Last 24 Hours








Test


  5/16/18


12:22 5/16/18


13:22 5/16/18


13:35 5/16/18


13:49


 


White Blood Count 3.49 K/uL    


 


Red Blood Count 4.72 M/uL    


 


Hemoglobin 13.4 g/dL    


 


Hematocrit 38.5 %    


 


Mean Corpuscular Volume 81.6 fL    


 


Mean Corpuscular Hemoglobin 28.4 pg    


 


Mean Corpuscular Hemoglobin


Concent 34.8 g/dl 


  


  


  


 


 


Platelet Count 74 K/uL    


 


Mean Platelet Volume 10.0 fL    


 


Neutrophils (%) (Auto) 56.7 %    


 


Lymphocytes (%) (Auto) 30.7 %    


 


Monocytes (%) (Auto) 11.2 %    


 


Eosinophils (%) (Auto) 0.0 %    


 


Basophils (%) (Auto) 1.1 %    


 


Neutrophils # (Auto) 1.98 K/uL    


 


Lymphocytes # (Auto) 1.07 K/uL    


 


Monocytes # (Auto) 0.39 K/uL    


 


Eosinophils # (Auto) 0.00 K/uL    


 


Basophils # (Auto) 0.04 K/uL    


 


RDW Standard Deviation 41.9 fL    


 


RDW Coefficient of Variation 13.9 %    


 


Immature Granulocyte % (Auto) 0.3 %    


 


Immature Granulocyte # (Auto) 0.01 K/uL    


 


Platelet Estimate DECREASED    


 


Sodium Level 133 mmol/L    


 


Potassium Level 3.9 mmol/L    


 


Chloride Level 100 mmol/L    


 


Carbon Dioxide Level 24 mmol/L    


 


Anion Gap 9.0 mmol/L    


 


Blood Urea Nitrogen 17 mg/dl    


 


Creatinine 0.86 mg/dl    


 


Est Creatinine Clear Calc


Drug Dose 59.5 ml/min 


  


  


  


 


 


Estimated GFR (


American) 81.0 


  


  


  


 


 


Estimated GFR (Non-


American 69.9 


  


  


  


 


 


BUN/Creatinine Ratio 19.3    


 


Random Glucose 88 mg/dl    


 


Calcium Level 8.4 mg/dl    


 


Total Bilirubin 1.0 mg/dl    


 


Direct Bilirubin 0.2 mg/dl    


 


Aspartate Amino Transf


(AST/SGOT) 67 U/L 


  


  


  


 


 


Alanine Aminotransferase


(ALT/SGPT) 48 U/L 


  


  


  


 


 


Alkaline Phosphatase 91 U/L    


 


Total Creatine Kinase 47 U/L    


 


Total Protein 7.3 gm/dl    


 


Albumin 3.5 gm/dl    


 


Procalcitonin 0.44 ng/ml    


 


Lyme Disease IgG Antibody NEG    


 


Lyme Disease IgM Antibody NEG    


 


Hepatitis C Antibody Screen NEG    


 


Prothrombin Time  10.7 SECONDS   


 


Prothromb Time International


Ratio 


  1.0 


  


  


 


 


Lactic Acid Level   1.0 mmol/L  


 


Influenza Type A Antigen


  


  


  


  Neg for Influ


A


 


Influenza Type B Antigen


  


  


  


  Neg for Influ


B


 


Test


  5/16/18


15:20 5/16/18


20:58 5/17/18


06:17 5/17/18


07:29


 


Urine Color YELLOW    


 


Urine Appearance CLEAR    


 


Urine pH 5.0    


 


Urine Specific Gravity 1.032    


 


Urine Protein TRACE    


 


Urine Glucose (UA) 3+    


 


Urine Ketones 2+    


 


Urine Occult Blood NEG    


 


Urine Nitrite NEG    


 


Urine Bilirubin NEG    


 


Urine Urobilinogen NEG    


 


Urine Leukocyte Esterase NEG    


 


Urine WBC (Auto) 1-5 /hpf    


 


Urine RBC (Auto) 0-4 /hpf    


 


Urine Hyaline Casts (Auto) 1-5 /lpf    


 


Urine Epithelial Cells (Auto) 5-10 /lpf    


 


Urine Bacteria (Auto) NEG    


 


Bedside Glucose  97 mg/dl   61 mg/dl 


 


White Blood Count   3.99 K/uL  


 


Red Blood Count   3.88 M/uL  


 


Hemoglobin   11.0 g/dL  


 


Hematocrit   32.2 %  


 


Mean Corpuscular Volume   83.0 fL  


 


Mean Corpuscular Hemoglobin   28.4 pg  


 


Mean Corpuscular Hemoglobin


Concent 


  


  34.2 g/dl 


  


 


 


Platelet Count   63 K/uL  


 


Mean Platelet Volume   10.9 fL  


 


RDW Standard Deviation   43.2 fL  


 


RDW Coefficient of Variation   14.2 %  


 


Sodium Level   138 mmol/L  


 


Potassium Level   3.9 mmol/L  


 


Chloride Level   106 mmol/L  


 


Carbon Dioxide Level   25 mmol/L  


 


Anion Gap   7.0 mmol/L  


 


Blood Urea Nitrogen   16 mg/dl  


 


Creatinine   0.65 mg/dl  


 


Est Creatinine Clear Calc


Drug Dose 


  


  78.1 ml/min 


  


 


 


Estimated GFR (


American) 


  


  106.5 


  


 


 


Estimated GFR (Non-


American 


  


  91.9 


  


 


 


BUN/Creatinine Ratio   25.2  


 


Random Glucose   53 mg/dl  


 


Estimated Average Glucose   174 mg/dl  


 


Hemoglobin A1c   7.7 %  


 


Calcium Level   7.4 mg/dl  


 


Total Bilirubin   0.5 mg/dl  


 


Direct Bilirubin   0.2 mg/dl  


 


Aspartate Amino Transf


(AST/SGOT) 


  


  49 U/L 


  


 


 


Alanine Aminotransferase


(ALT/SGPT) 


  


  36 U/L 


  


 


 


Alkaline Phosphatase   68 U/L  


 


Total Protein   5.7 gm/dl  


 


Albumin   2.7 gm/dl  


 


Test


  5/17/18


07:45 


  


  


 


 


Bedside Glucose 91 mg/dl    





                                                                               

                                                                 


Patient Name: DARY KAUFMAN                               Unit Number:  [id number] 

                 


 





 











Dictated: 05/16/18 1617


 


Transcribed: 05/16/18 1617


 


JRB


 


Printed Date/Time: [~ rep prt dt]/[~ rep prt tm]








 [~ rep ct labl] - [~ rep ct ivnm]


 





   [facility]


 Radiology Department


 [place] [address] [phone]





 











Dictated: 05/16/18 1617


 


Transcribed: 05/16/18 1617


 


JRB


 


Printed Date/Time: [~ rep prt dt]/[~ rep prt tm]








 [~ rep ct labl] - [~ rep ct ivnm]


 








ABDOMEN AND PELVIS CT WITH IV CONTRAST





CT DOSE: 728.33 mGycm





HISTORY: Acute right lower quadrant abdominal pain with fever  fever rlq pain


r/o appy





TECHNIQUE: Multiaxial CT images of the abdomen and pelvis were performed


following the use of intravenous contrast.  A dose lowering technique was


utilized adhering to the principles of ALARA.





COMPARISON STUDY: None.





FINDINGS: 


Dependent subsegmental bibasilar opacities suggest atelectasis. There is no


pneumatosis or pneumoperitoneum identified. Coronary arterial calcifications are


noted.





Contracted gallbladder. Mild periportal edema, likely secondary to


overhydration. Spleen is mildly enlarged, 15 cm. Pancreas and adrenal glands are


within normal limits. Mild nonspecific bilateral perinephric stranding.


Bilateral renal cysts measuring up to 2.0 cm on the left. No renal calculi or


obstructive uropathy identified. Bladder is within normal limits. Prior


hysterectomy. No adnexal mass lesions identified. Trace free pelvic fluid of


unknown etiology. There is moderate mixed plaquing of the aorta without aneurysm


identified. No bulky adenopathy. Nonspecific mildly prominent and enlarged


periportal lymph nodes measure up to 12 mm in short axis.





Mild wall thickening of the distal gastric body and antrum with partial


distention. No bowel obstruction. Mild colonic diverticulosis without


diverticulitis. Moderate stool volume throughout the colon. Underdistention of


the transverse colon. High attenuating material seen within the cecum. The


visualized appendix appears normal.





Soft tissues are unremarkable. Dystrophic calcifications within the region of


the bilateral hamstring attachment sites, right greater than left.


Chondrocalcinosis with degenerative changes about the bilateral hips. Bone


island of the left iliac bones.





IMPRESSION: 





1.  No evidence of bowel obstruction. Normal appendix.


2. Mild wall thickening of the distal gastric body and antrum is likely


secondary to partial distention with gastritis thought to be less likely.


3. Mild nonspecific periportal adenopathy.


4. Splenomegaly.


5. Trace free pelvic fluid of unknown etiology.


6. Additional findings as above.











Electronically signed by:  Shaw Gtz M.D.


5/16/2018 4:26 PM





Dictated Date/Time:  5/16/2018 4:17 PM





 The status of this report is Signed.   


 Draft = Not yet reviewed or approved by Radiologist.  


 Signed = Reviewed and approved by Radiologist.


<AttendingPhy></AttendingPhy> <FamilyPhy>Tonya Brown M.D.</FamilyPhy> <

PrimaryPhy>Tonya rBown M.D.</PrimaryPhy> <UnitNumber>[id number]</UnitNumber

> <VisitNumber>[id number]</VisitNumber> <PatientName>DARY KAUFMAN</PatientName> <

DateOfBirth>1950</DateOfBirth> <Location>[facility]</Location> <ServiceDate>05/ 16/18</ServiceDate> <MNE>ESINDI</MNE> <OrderingPhy>Don Davis M.D.</

OrderingPhy> <OrderingPhyMNE>f rep ord dr power</OrderingPhyMNE> <DictatingPhyMNE>

f rep dict dr power</DictatingPhyMNE> <CCListMNE>f rep ct mne</CCListMNE> <

AdmittingPhyMNE>f pt admit dr power</AdmittingPhyMNE> <AttendingPhyMNE>f pt 

attend dr power</AttendingPhyMNE>


<ConsultingPhyMNE>f pt consult dr power</ConsultingPhyMNE> <FamilyPhyMNE>f pt fam 

dr power</FamilyPhyMNE> <OtherPhyMNE>f pt other dr power</OtherPhyMNE> <

PrimaryPhyMNE>f pt prim care dr power</PrimaryPhyMNE> <ReferringPhyMNE>f pt 

referring dr power</ReferringPhyMNE>





Assessment & Plan


67-year-old female with 1 week of fever, GI complaints, myalgias and arthralgias

, with neutropenia and thrombocytopenia and mild liver enzyme elevation.  

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 

especially given what appears to be rapid response to doxycycline therapy.  

Await final blood cultures results.  Await peripheral smear review for possible 

presence of inclusions, as DNA study will likely take several days to come 

back.  Will follow.

## 2018-05-17 NOTE — DIAGNOSTIC IMAGING REPORT
LUMBAR SPINE 2 OR 3 VIEWS



CLINICAL HISTORY: 67 years-old Female presenting with back pain /fall . 



TECHNIQUE: Frontal, lateral, and coned in lateral views of the lumbar spine were

obtained. 



COMPARISON: CT of the abdomen and pelvis from earlier the same day.



FINDINGS:

No significant scoliosis. Normal lumbar lordosis. Vertebral bodies maintain

normal height and alignment. Anterior osteophytosis most severe at L1-2.

Intervertebral disc heights preserved. No evidence of a compression deformity or

subluxation. Mild osseous neural foraminal narrowing may be present at L4-5

secondary to bony spurring of the facet joints. Atherosclerosis. Excreted

contrast noted in the urinary bladder.



IMPRESSION:

1.  Mild multilevel degenerative changes of the lumbar spine.



2.  No radiographic evidence of acute osseous injury.







Electronically signed by:  Ashihs Rodas M.D.

5/17/2018 7:42 AM



Dictated Date/Time:  5/17/2018 6:55 AM

## 2018-05-17 NOTE — CLINICAL DOCUMENTATION QUERY
**** CLINICAL DOCUMENTATION QUERY****



Dr. WALTER, 



In your clinical opinion is this patient being managed for:

    

                        (  ) pancytopenia

                        (X  ) Not Agree



                        (  ) Other explanation of clinical findings (No explanation is 
considered a No Response)

                        (  ) Unable to determine 

                        (  ) Need to Discuss (Phone CDS or qliq) (No discussion is 
considered a No Response)

                                             

Hb 11 mostly dilutional. Improved by next day. Thank you





The medical record reflects the following clinical findings, treatment, and risk factors.  
  



Clinical Indicators: 68 yo female presenting with fever and tachycardia. Presented with 
thrombocytopenia/neutropenia. Repeated labs CBC 3.99, Hgb 11, Hct 32.2, Plts 63. 

Treatment: repeat CBC, pending ID and hem/onc consult, Antigen and antibody titer for 
anaplasmosis, IV doxycycline, avoid antiplatlets

Risk Factors: suspected anaplasmosis, DM



Please clarify and document your clinical opinion in the progress notes and discharge 
summary. Terms such as "probable", "suspected", "likely", "questionable", "possible", or 
"still to be ruled out" are acceptable. 



*****IF IN AGREEMENT, YOU MUST DOCUMENT ABOVE DIAGNOSTIC STATEMENT IN DAILY PROGRESS NOTES 
AND DISCHARGE SUMMARY. This document is not part of the patient's record.*****

Thank You, Hallie Nixon, [phone]

## 2018-05-17 NOTE — DIAGNOSTIC IMAGING REPORT
THORACIC SPINE 2-VIEWS



CLINICAL HISTORY: 67 years-old Female presenting with upper back pain /fall

earlier today . 



TECHNIQUE: 3 views of the thoracic spine were obtained. 



COMPARISON: None.



FINDINGS:

Normal thoracic kyphosis. No compression deformity allowing for limited

visualization of the upper thoracic spine due to overlapping shaft fractures.

Flowing anterior osteophytosis across more than 4 levels. Intervertebral disc

heights grossly preserved. No scoliosis. Visualized portion of the thorax within

normal limits. Atherosclerosis.



IMPRESSION:

1.  No radiographic evidence of acute osseous injury.



2.  Limited visualization of the upper thoracic spine due to overlapping

structures. If there is specific clinical concern for the cervicothoracic

junction, cross-sectional imaging could be considered.



3.  Flowing osteophytosis could represent diffuse idiopathic skeletal

hyperostosis.







Electronically signed by:  Ashish Rodas M.D.

5/17/2018 7:18 AM



Dictated Date/Time:  5/17/2018 6:55 AM

## 2018-05-17 NOTE — PROGRESS NOTE
Internal Med Progress Note


Date of Service:


May 17, 2018.


Provider Documentation:





SUBJECTIVE:





feeling much better


afebrile today


no sob or cough


no chest pain


eating ok


no complaints


had tick bites recently and thinks they were on the body for about a day








OBJECTIVE:





Vital Signs-as noted below





Exam:


General-alert and oriented. Not in distress


ENT-Normal hearing


Neck-no neck masses


Lungs-cta b/l no wheezing no crackles


Heart-s1 and s2 heard regular rate and rhythm no murmurs


Abdomen-soft bowel sounds present non tender, no distension


Extremities-no erythema


Neuro-alert and awake


'moves extremities





Lab data as noted below.


ASSESSMENT & PLAN:


FEVER, TACHYCARDIA, POSSIBLE TICK BORNE DISEASE


History of tick bite, multiple tics removed from arm approximately 2 weeks ago


Presents with more than 1 week symptoms of fever, chills generalized weakness 

fatigue, nausea vomiting headache poor appetite


Lyme titer negative


No typical rash noted


mostlikely anaplasmosis'


peripheral smear unremarkable


symptoms improved with initiation of doxycycline


Appreciate ID inputs











THROMBOCYTOPENIA/NEUTROPENIA


possibly from anaplasmosis


will monitor





GENERALIZED MUSCLE PAIN AND ACHE


from above





BACK PAIN:


After sustaining a fall due to losing balance and generalized weakness


X-ray of thoracolumbar spine ordered-no acute findings











TYPE 2 DIABETES


Holding oral meds


Insulin sliding scale


will monitor








CODE STATUS: Full code





DVT prophylaxis:





Disposition:


PT OT 


Service consulted for discharge planning





Vital Signs:











  Date Time  Temp Pulse Resp B/P (MAP) Pulse Ox O2 Delivery O2 Flow Rate FiO2


 


5/17/18 16:03     97 Room Air 2.0 


 


5/17/18 15:12 36.7 65 20 121/64 (83) 97 Room Air  


 


5/17/18 12:47     97 Room Air  


 


5/17/18 08:01     97 Room Air 2.0 


 


5/17/18 07:22 36.6 68 16 114/70 (85) 97 Room Air  


 


5/17/18 04:00     99   


 


5/17/18 03:50 36.8 61 16 107/57 (74) 96 Room Air  


 


5/17/18 00:01 37.0 77 16 124/65 (84) 100 Nasal Cannula 2.0 


 


5/16/18 23:59     99 Nasal Cannula 3.0 


 


5/16/18 20:25 36.8 75 18 116/65 (82) 99 Nasal Cannula 2.5 


 


5/16/18 20:00     97 Nasal Cannula 3.0 


 


5/16/18 18:42 36.4 79 18 100/63 97 Nasal Cannula 3.0 


 


5/16/18 18:00 36.7 70 18 112/59 99 Nasal Cannula  








Lab Results:





Results Past 24 Hours








Test


  5/16/18


20:58 5/17/18


06:17 5/17/18


07:29 5/17/18


07:45 Range/Units


 


 


Bedside Glucose 97  61 91 70-90  mg/dl


 


White Blood Count  3.99   4.8-10.8  K/uL


 


Red Blood Count  3.88   4.2-5.4  M/uL


 


Hemoglobin  11.0   12.0-16.0  g/dL


 


Hematocrit  32.2   37-47  %


 


Mean Corpuscular Volume  83.0     fL


 


Mean Corpuscular Hemoglobin  28.4   25-34  pg


 


Mean Corpuscular Hemoglobin


Concent 


  34.2


  


  


  32-36  g/dl


 


 


Platelet Count  63   130-400  K/uL


 


Mean Platelet Volume  10.9   7.4-10.4  fL


 


RDW Standard Deviation  43.2   36.4-46.3  fL


 


RDW Coefficient of Variation  14.2   11.5-14.5  %


 


Neutrophils % (Manual)  58.9    %


 


Lymphocytes % (Manual)  29.5    %


 


Monocytes % (Manual)  11.6    %


 


Neutrophils # (Manual)  2.35   1.4-6.5  K/uL


 


Total Absolute Neutrophils  2.35   1.4-6.5  K/uL


 


Lymphocytes # (Manual)  1.18   1.2-3.4  K/uL


 


Total Absolute Lymphocytes  1.18   1.2-3.4  K/uL


 


Monocytes # (Manual)  0.46   0.11-0.59  K/uL


 


Sodium Level  138   136-145  mmol/L


 


Potassium Level  3.9   3.5-5.1  mmol/L


 


Chloride Level  106     mmol/L


 


Carbon Dioxide Level  25   21-32  mmol/L


 


Anion Gap  7.0   3-11  mmol/L


 


Blood Urea Nitrogen  16   7-18  mg/dl


 


Creatinine


  


  0.65


  


  


  0.60-1.20


mg/dl


 


Est Creatinine Clear Calc


Drug Dose 


  78.1


  


  


   ml/min


 


 


Estimated GFR (


American) 


  106.5


  


  


   


 


 


Estimated GFR (Non-


American 


  91.9


  


  


   


 


 


BUN/Creatinine Ratio  25.2   10-20  


 


Random Glucose  53   70-99  mg/dl


 


Estimated Average Glucose  174    mg/dl


 


Hemoglobin A1c  7.7   4.5-5.6  %


 


Calcium Level  7.4   8.5-10.1  mg/dl


 


Total Bilirubin  0.5   0.2-1  mg/dl


 


Direct Bilirubin  0.2   0-0.2  mg/dl


 


Aspartate Amino Transf


(AST/SGOT) 


  49


  


  


  15-37  U/L


 


 


Alanine Aminotransferase


(ALT/SGPT) 


  36


  


  


  12-78  U/L


 


 


Alkaline Phosphatase  68     U/L


 


Total Protein  5.7   6.4-8.2  gm/dl


 


Albumin  2.7   3.4-5.0  gm/dl


 


Test


  5/17/18


11:16 5/17/18


17:11 


  


  Range/Units


 


 


Bedside Glucose 239 140   70-90  mg/dl








Microbiology Results


5/17/18 Urine Culture, Received


          Pending

## 2018-05-18 VITALS
SYSTOLIC BLOOD PRESSURE: 127 MMHG | HEART RATE: 74 BPM | DIASTOLIC BLOOD PRESSURE: 67 MMHG | TEMPERATURE: 98.06 F | OXYGEN SATURATION: 97 %

## 2018-05-18 VITALS
SYSTOLIC BLOOD PRESSURE: 140 MMHG | DIASTOLIC BLOOD PRESSURE: 66 MMHG | OXYGEN SATURATION: 97 % | TEMPERATURE: 98.78 F | HEART RATE: 76 BPM

## 2018-05-18 VITALS
DIASTOLIC BLOOD PRESSURE: 96 MMHG | OXYGEN SATURATION: 96 % | SYSTOLIC BLOOD PRESSURE: 130 MMHG | HEART RATE: 73 BPM | TEMPERATURE: 97.88 F

## 2018-05-18 VITALS
SYSTOLIC BLOOD PRESSURE: 140 MMHG | TEMPERATURE: 98.78 F | DIASTOLIC BLOOD PRESSURE: 66 MMHG | OXYGEN SATURATION: 97 % | HEART RATE: 76 BPM

## 2018-05-18 LAB
BASOPHILS # BLD: 0.05 K/UL (ref 0–0.2)
BASOPHILS NFR BLD: 0.9 %
BUN SERPL-MCNC: 20 MG/DL (ref 7–18)
CALCIUM SERPL-MCNC: 8.9 MG/DL (ref 8.5–10.1)
CO2 SERPL-SCNC: 27 MMOL/L (ref 21–32)
CREAT SERPL-MCNC: 0.77 MG/DL (ref 0.6–1.2)
EOS ABS #: 0.01 K/UL (ref 0–0.5)
EOSINOPHIL NFR BLD AUTO: 96 K/UL (ref 130–400)
GLUCOSE SERPL-MCNC: 144 MG/DL (ref 70–99)
HCT VFR BLD CALC: 37.2 % (ref 37–47)
HGB BLD-MCNC: 12.8 G/DL (ref 12–16)
IG#: 0.02 K/UL (ref 0–0.02)
IMM GRANULOCYTES NFR BLD AUTO: 54 %
LYMPHOCYTES # BLD: 3 K/UL (ref 1.2–3.4)
MCH RBC QN AUTO: 28.4 PG (ref 25–34)
MCHC RBC AUTO-ENTMCNC: 34.4 G/DL (ref 32–36)
MCV RBC AUTO: 82.5 FL (ref 80–100)
MONO ABS #: 0.77 K/UL (ref 0.11–0.59)
MONOCYTES NFR BLD: 13.8 %
NEUT ABS #: 1.71 K/UL (ref 1.4–6.5)
NEUTROPHILS # BLD AUTO: 0.2 %
NEUTROPHILS NFR BLD AUTO: 30.7 %
PMV BLD AUTO: 10.4 FL (ref 7.4–10.4)
POTASSIUM SERPL-SCNC: 4.4 MMOL/L (ref 3.5–5.1)
RED CELL DISTRIBUTION WIDTH CV: 14.2 % (ref 11.5–14.5)
RED CELL DISTRIBUTION WIDTH SD: 43 FL (ref 36.4–46.3)
SODIUM SERPL-SCNC: 140 MMOL/L (ref 136–145)
WBC # BLD AUTO: 5.56 K/UL (ref 4.8–10.8)

## 2018-05-18 RX ADMIN — LIDOCAINE SCH PATCH: 50 PATCH CUTANEOUS at 08:46

## 2018-05-18 RX ADMIN — LISINOPRIL SCH MG: 40 TABLET ORAL at 08:47

## 2018-05-18 RX ADMIN — Medication SCH MG: at 08:47

## 2018-05-18 RX ADMIN — INSULIN ASPART SCH UNITS: 100 INJECTION, SOLUTION INTRAVENOUS; SUBCUTANEOUS at 16:15

## 2018-05-18 RX ADMIN — KETOROLAC TROMETHAMINE PRN MG: 15 INJECTION INTRAMUSCULAR; INTRAVENOUS at 01:35

## 2018-05-18 RX ADMIN — INSULIN ASPART SCH UNITS: 100 INJECTION, SOLUTION INTRAVENOUS; SUBCUTANEOUS at 08:52

## 2018-05-18 RX ADMIN — ATORVASTATIN CALCIUM SCH MG: 20 TABLET, FILM COATED ORAL at 08:47

## 2018-05-18 RX ADMIN — INSULIN ASPART SCH UNITS: 100 INJECTION, SOLUTION INTRAVENOUS; SUBCUTANEOUS at 12:30

## 2018-05-18 RX ADMIN — DOXYCYCLINE HYCLATE SCH MG: 100 CAPSULE, GELATIN COATED ORAL at 08:47

## 2018-05-18 NOTE — PROGRESS NOTE
Internal Med Progress Note


Date of Service:


May 18, 2018.


Provider Documentation:





SUBJECTIVE:





resting comfortably


afebrile


feeling much better


ambulating ok


wants to go home








OBJECTIVE:





Vital Signs-as noted below





Exam:


General-alert and oriented. Not in distress


ENT-Normal hearing


Neck-no neck masses


Lungs-cta b/l no wheezing no crackles


Heart-s1 and s2 heard regular rate and rhythm no murmurs


Abdomen-soft bowel sounds present non tender, no distension


Extremities-no erythema


Neuro-alert and awake


'moves extremities





Lab data as noted below.


ASSESSMENT & PLAN:


FEVER, TACHYCARDIA, POSSIBLE TICK BORNE DISEASE


History of tick bite, multiple tics removed from arm approximately 2 weeks ago


Presents with more than 1 week symptoms of fever, chills generalized weakness 

fatigue, nausea vomiting headache poor appetite


Lyme titer negative


No typical rash noted


mostlikely anaplasmosis'


peripheral smear unremarkable


symptoms improved with initiation of doxycycline


Appreciate ID inputs


discharged on doxycycline for 14 days











THROMBOCYTOPENIA/NEUTROPENIA


possibly from anaplasmosis


improving


platelets 96 today


f/u labs with pcp





GENERALIZED MUSCLE PAIN AND ACHE


from above





BACK PAIN:


After sustaining a fall due to losing balance and generalized weakness


X-ray of thoracolumbar spine ordered-no acute findings











TYPE 2 DIABETES


Holding oral meds


Insulin sliding scale


d/c on home meds








discharged home


Vital Signs:











  Date Time  Temp Pulse Resp B/P (MAP) Pulse Ox O2 Delivery O2 Flow Rate FiO2


 


5/18/18 13:59 37.1 76 16  97 Room Air  


 


5/18/18 12:00      Room Air  


 


5/18/18 11:30 37.1 76 16 140/66 (90) 97 Room Air  


 


5/18/18 08:30      Room Air  


 


5/18/18 06:12 36.6 73 18 130/96 (107) 96 Room Air  


 


5/18/18 05:17 36.7 74 18 127/67 (87) 97 Room Air  


 


5/18/18 04:00      Room Air  


 


5/17/18 23:59      Room Air  


 


5/17/18 23:48 36.9 69 19 122/64 (83) 97 Room Air  








Lab Results:





Results Past 24 Hours








Test


  5/18/18


07:27 5/18/18


07:40 5/18/18


11:28 Range/Units


 


 


Bedside Glucose 136  189 70-90  mg/dl


 


White Blood Count  5.56  4.8-10.8  K/uL


 


Red Blood Count  4.51  4.2-5.4  M/uL


 


Hemoglobin  12.8  12.0-16.0  g/dL


 


Hematocrit  37.2  37-47  %


 


Mean Corpuscular Volume  82.5    fL


 


Mean Corpuscular Hemoglobin  28.4  25-34  pg


 


Mean Corpuscular Hemoglobin


Concent 


  34.4


  


  32-36  g/dl


 


 


Platelet Count  96  130-400  K/uL


 


Mean Platelet Volume  10.4  7.4-10.4  fL


 


Neutrophils (%) (Auto)  30.7   %


 


Lymphocytes (%) (Auto)  54.0   %


 


Monocytes (%) (Auto)  13.8   %


 


Eosinophils (%) (Auto)  0.2   %


 


Basophils (%) (Auto)  0.9   %


 


Neutrophils # (Auto)  1.71  1.4-6.5  K/uL


 


Lymphocytes # (Auto)  3.00  1.2-3.4  K/uL


 


Monocytes # (Auto)  0.77  0.11-0.59  K/uL


 


Eosinophils # (Auto)  0.01  0-0.5  K/uL


 


Basophils # (Auto)  0.05  0-0.2  K/uL


 


RDW Standard Deviation  43.0  36.4-46.3  fL


 


RDW Coefficient of Variation  14.2  11.5-14.5  %


 


Immature Granulocyte % (Auto)  0.4   %


 


Immature Granulocyte # (Auto)  0.02  0.00-0.02  K/uL


 


Sodium Level  140  136-145  mmol/L


 


Potassium Level  4.4  3.5-5.1  mmol/L


 


Chloride Level  108    mmol/L


 


Carbon Dioxide Level  27  21-32  mmol/L


 


Anion Gap  5.0  3-11  mmol/L


 


Blood Urea Nitrogen  20  7-18  mg/dl


 


Creatinine


  


  0.77


  


  0.60-1.20


mg/dl


 


Est Creatinine Clear Calc


Drug Dose 


  65.2


  


   ml/min


 


 


Estimated GFR (


American) 


  92.0


  


   


 


 


Estimated GFR (Non-


American 


  79.3


  


   


 


 


BUN/Creatinine Ratio  25.8  10-20  


 


Random Glucose  144  70-99  mg/dl


 


Calcium Level  8.9  8.5-10.1  mg/dl

## 2018-05-18 NOTE — DISCHARGE INSTRUCTIONS
Discharge Instructions


Date of Service


May 18, 2018.





Admission


Reason for Admission:  Fever, Tachycardia





Discharge


Discharge Diagnosis / Problem:  anaplasmosis, thrombocytopenia





Discharge Goals


Goal(s):  Decrease discomfort, Improve function





Activity Recommendations


Activity Limitations:  resume your previous activity





.





Instructions / Follow-Up


Instructions / Follow-Up


FOLLOWUP WITH FAMILY DOCTOR  ON MAY 21ST AT 12:25PM.





LAB: CBC WITH DIFF IN 5-7 DAYS AND FOLLOW RESULTS WITH FAMILY DOCTOR





Current Hospital Diet


Patient's current hospital diet: Diabetes Type 2 Diet





Discharge Diet


Recommended Diet:  Diabetes Type 2 Diet





Pending Studies


Studies pending at discharge:  yes


List of pending studies:  


TICK BITE STUDIES-ANAPLASMOSIS





Laboratory Results





Hemoglobin A1c








Test


  5/17/18


06:17 Range/Units


 


 


Estimated Average Glucose 174   mg/dl


 


Hemoglobin A1c 7.7 H 4.5-5.6  %











Medical Emergencies








.


Who to Call and When:





Medical Emergencies:  If at any time you feel your situation is an emergency, 

please call 911 immediately.





.





Non-Emergent Contact


Non-Emergency issues call your:  Primary Care Provider





.


.








"Provider Documentation" section prepared by Edward Lee.








.

## 2018-05-18 NOTE — DISCHARGE SUMMARY
Discharge Summary


Date of Service


May 18, 2018.





Discharge Summary


Admission Date:


May 16, 2018 at 16:57


Discharge Date:  May 18, 2018


Discharge Disposition:  Home


Principal Diagnosis:


ANAPLASMOSIS


THROMBOCYTOPENIA


Secondary Diagnoses/Problems:


1) Diabetes


(2) Fever


(3) HLD (hyperlipidemia)


(4) HTN (hypertension)


(5) Tachycardia


Procedures:


CXR:


No active disease in the chest.





CT ABD/PELVIS:


1.  No evidence of bowel obstruction. Normal appendix.


2. Mild wall thickening of the distal gastric body and antrum is likely


secondary to partial distention with gastritis thought to be less likely.


3. Mild nonspecific periportal adenopathy.


4. Splenomegaly.


5. Trace free pelvic fluid of unknown etiology.





THORACIC SPINE XRAY:





1.  No radiographic evidence of acute osseous injury.





2.  Limited visualization of the upper thoracic spine due to overlapping


structures. If there is specific clinical concern for the cervicothoracic


junction, cross-sectional imaging could be considered.





3.  Flowing osteophytosis could represent diffuse idiopathic skeletal


hyperostosis.





LUMBAR SPINE XRAY:


1.  Mild multilevel degenerative changes of the lumbar spine.





2.  No radiographic evidence of acute osseous injury.


Consultations:


ID





Medication Reconciliation


New Medications:  


Lactobacillus Acidophilus (Lactinex)  Tab


1 TAB PO BID for 14 Days, TAB





Doxycycline Hyclate (Doxycycline Hyclate) 100 Mg Cap


100 MG PO BID for 14 Days, #28 CAP





 


Continued Medications:  


Aspirin (Aspirin Ec) 81 Mg Tab


81 MG PO DAILY





Atorvastatin (Lipitor) 20 Mg Tab


1 TAB PO DAILY for 90 Days, #90 TAB 1 Refill





Empagliflozin (Jardiance) 25 Mg Tab








Glimepiride (Glimepiride) 1 Mg Tab


1 TAB PO DAILY for 30 Days, #30 TAB 5 Refills





Glucostix Blood Test Strips (Onetouch Ultra Blue) 1 Ea Strp








Lisinopril (Lisinopril) 40 Mg Tab








Magnesium Oxide (Mag-Ox) 400 Mg Tab


400 MG PO, TAB





Metformin HCl (Metformin HCl ER) 1,000 Mg Tab














Admission Information


HPI (per Admitting provider):


This is a 67-year-old female with medical history of type 2 diabetes, presents 

to ER with complaint of weakness tiredness, low-grade fever for the past few 

weeks


At baseline patient is very active involved in housework independent in her ADLs


Had ongoing nausea, headache, poor appetite


history of multiple tick bites


Patient had 3 tics on her arm 2 weeks ago, which patient's her  removed 

with tweezers


Was seen at the clinic for her ongoing symptoms


Outpatient Lyme titer was checked which was negative 





This morning she had an witnessed fall, losing balance and falling on the floor-

 was present, helped patient to get up


Patient mentions of feeling dizzy and lightheaded prior to fall


No loss of consciousness, denies of any chest pain or shortness of breath





On arrival to ER patient was found to be febrile, borderline neutropenic, 

thrombocytopenic (had normal heart work in the past)


Physical Exam (per Admitting):  


   General Appearance:  + mild distress


   Head:  normocephalic, atraumatic (100 back pain)


   Eyes:  normal inspection, sclerae normal


   ENT:  normal ENT inspection


   Neck:  no carotid bruits, trachea midline


   Respiratory/Chest:  chest non-tender, lungs clear, normal breath sounds, no 

respiratory distress


   Cardiovascular:  regular rate, rhythm, no edema, normal peripheral pulses


   Abdomen/GI:  normal bowel sounds, non tender, soft


   Back:  + paravertebral tenderness, + pertinent finding (Tenderness in upper 

mid thoracic area started after fall this morning /no bruise / skin tear noted)


   Extremities/Musculoskelatal:  no calf tenderness, no pedal edema


   Neurologic/Psych:  no motor/sensory deficits, alert, normal mood/affect, 

oriented x 3, + pertinent finding (Generalized weakness)


   Skin:  no rash





Hospital Course


FEVER, TACHYCARDIA, POSSIBLE TICK BORNE DISEASE


History of tick bite, multiple tics removed from arm approximately 2 weeks ago


Presents with more than 1 week symptoms of fever, chills generalized weakness 

fatigue, nausea vomiting headache poor appetite


Lyme titer negative


No typical rash noted


mostlikely anaplasmosis'


peripheral smear unremarkable


symptoms improved with initiation of doxycycline


Appreciate ID inputs


discharged on doxycycline for 14 days











THROMBOCYTOPENIA/NEUTROPENIA


possibly from anaplasmosis


improving


platelets 96 today


f/u labs with pcp





GENERALIZED MUSCLE PAIN AND ACHE


from above





BACK PAIN:


After sustaining a fall due to losing balance and generalized weakness


X-ray of thoracolumbar spine ordered-no acute findings











TYPE 2 DIABETES


Holding oral meds


Insulin sliding scale


d/c on home meds








discharged home


Total time spent on discharge = 35MINUTES


This includes examination of the patient, discharge planning, medication 

reconciliation, and communication with other providers.





Discharge Instructions


Discharge Instructions


Date of Service


May 18, 2018.





Admission


Reason for Admission:  Fever, Tachycardia





Discharge


Discharge Diagnosis / Problem:  anaplasmosis, thrombocytopenia





Discharge Goals


Goal(s):  Decrease discomfort, Improve function





Activity Recommendations


Activity Limitations:  resume your previous activity





.





Instructions / Follow-Up


Instructions / Follow-Up


FOLLOWUP WITH FAMILY DOCTOR  ON MAY 21ST AT 12:25PM.





LAB: CBC WITH DIFF IN 5-7 DAYS AND FOLLOW RESULTS WITH FAMILY DOCTOR





Current Hospital Diet


Patient's current hospital diet: Diabetes Type 2 Diet





Discharge Diet


Recommended Diet:  Diabetes Type 2 Diet





Pending Studies


Studies pending at discharge:  yes


List of pending studies:  


TICK BITE STUDIES-ANAPLASMOSIS





Laboratory Results





Hemoglobin A1c








Test


  5/17/18


06:17 Range/Units


 


 


Estimated Average Glucose 174   mg/dl


 


Hemoglobin A1c 7.7 H 4.5-5.6  %











Medical Emergencies








.


Who to Call and When:





Medical Emergencies:  If at any time you feel your situation is an emergency, 

please call 911 immediately.





.





Non-Emergent Contact


Non-Emergency issues call your:  Primary Care Provider